# Patient Record
Sex: MALE | Race: WHITE | NOT HISPANIC OR LATINO | URBAN - METROPOLITAN AREA
[De-identification: names, ages, dates, MRNs, and addresses within clinical notes are randomized per-mention and may not be internally consistent; named-entity substitution may affect disease eponyms.]

---

## 2024-07-19 ENCOUNTER — OFFICE VISIT (OUTPATIENT)
Age: 54
End: 2024-07-19
Payer: COMMERCIAL

## 2024-07-19 VITALS
TEMPERATURE: 98.8 F | SYSTOLIC BLOOD PRESSURE: 152 MMHG | HEIGHT: 71 IN | DIASTOLIC BLOOD PRESSURE: 82 MMHG | BODY MASS INDEX: 21.73 KG/M2 | HEART RATE: 71 BPM | WEIGHT: 155.2 LBS | RESPIRATION RATE: 16 BRPM | OXYGEN SATURATION: 99 %

## 2024-07-19 DIAGNOSIS — R59.1 LYMPHADENOPATHY: ICD-10-CM

## 2024-07-19 DIAGNOSIS — L02.01 FACIAL ABSCESS: Primary | ICD-10-CM

## 2024-07-19 PROCEDURE — G0382 LEV 3 HOSP TYPE B ED VISIT: HCPCS | Performed by: EMERGENCY MEDICINE

## 2024-07-19 PROCEDURE — S9083 URGENT CARE CENTER GLOBAL: HCPCS | Performed by: EMERGENCY MEDICINE

## 2024-07-19 RX ORDER — CLINDAMYCIN HYDROCHLORIDE 300 MG/1
300 CAPSULE ORAL 3 TIMES DAILY
Qty: 21 CAPSULE | Refills: 0 | Status: SHIPPED | OUTPATIENT
Start: 2024-07-19 | End: 2024-07-26

## 2024-07-19 NOTE — PROGRESS NOTES
[facility]        NAME: Ulises Paiz is a 53 y.o. male  : 1970    MRN: [id number]  DATE: 2024  TIME: 10:22 AM    Assessment and Plan   Facial abscess [L02.01]  1. Facial abscess  clindamycin (CLEOCIN) 300 MG capsule    Ambulatory Referral to Otolaryngology      2. Lymphadenopathy  Ambulatory Referral to Otolaryngology            Patient Instructions     Patient Instructions   Patient Education     Lymphadenitis Discharge Instructions   About this topic   Lymphadenitis is another word for a swollen lymph node. A lymph node is a pea-shaped part of the lymph system that helps defend our body from germs. You have lymph nodes all over your body. They help trap foreign material and other cells that do not belong inside the body.  A swollen lymph node may mean there is an infection or disease in the body. Treatment for this condition depends on the cause. Most often, you will need drugs to treat your illness.  What care is needed at home?   Ask your doctor what you need to do when you go home. Make sure you ask questions if you do not understand what the doctor says.  Do not squeeze or try to drain the lymph node. This can make any infection you may have worse.  Place an ice pack or a bag of frozen peas wrapped in a towel over the painful part. Never put ice right on the skin. Do not leave the ice on more than 10 to 15 minutes at a time.  If your doctor tells you to use heat, put a heating pad on your painful area for no more than 20 minutes at a time. Never go to sleep with a heating pad on as this can cause burns.  What follow-up care is needed?   Your doctor may ask you to make visits to the office to check on your progress. Be sure to keep these visits.  What drugs may be needed?   The doctor may order drugs to:  Help with pain and swelling  Treat an infection  Treat the illness that causes the lymph nodes to swell and be painful  What problems could happen?   Infection  Pain  When do I need  to call the doctor?   Signs of infection. These include a fever of 100.4°F (38°C) or higher, chills.  More swelling, redness, or warmth over your lymph node.  Lymph node is draining fluid or pus.  Lymph nodes seem to be getting bigger.  Night sweats  Trouble swallowing or breathing  Teach Back: Helping You Understand   The Teach Back Method helps you understand the information we are giving you. After you talk with the staff, tell them in your own words what you learned. This helps to make sure the staff has described each thing clearly. It also helps to explain things that may have been confusing. Before going home, make sure you can do these:  I can tell you about my condition.  I can tell you what may help ease my pain.  I can tell you what I will do if I have a fever, my lymph nodes stay large or seem to be getting bigger, or I have trouble breathing or swallowing.  Last Reviewed Date   2019-04-18  Consumer Information Use and Disclaimer   This generalized information is a limited summary of diagnosis, treatment, and/or medication information. It is not meant to be comprehensive and should be used as a tool to help the user understand and/or assess potential diagnostic and treatment options. It does NOT include all information about conditions, treatments, medications, side effects, or risks that may apply to a specific patient. It is not intended to be medical advice or a substitute for the medical advice, diagnosis, or treatment of a health care provider based on the health care provider's examination and assessment of a patient’s specific and unique circumstances. Patients must speak with a health care provider for complete information about their health, medical questions, and treatment options, including any risks or benefits regarding use of medications. This information does not endorse any treatments or medications as safe, effective, or approved for treating a specific patient. [employer] and its  "affiliates disclaim any warranty or liability relating to this information or the use thereof. The use of this information is governed by the Terms of Use, available at https://www.[employer].com/en/know/clinical-effectiveness-terms   Copyright   Copyright © 2024 [employer] and its affiliates and/or licensors. All rights reserved.  Patient Education     Dental abscess   The Basics   Written by the doctors and editors at [facility]   What is a dental abscess? -- A dental abscess is a pocket of pus that forms in the mouth (figure 1). It is caused by an infection. It is also called a \"tooth abscess\" or \"abscessed tooth.\" Dental abscesses can form in the gums, next to a tooth, or in the end of the root of a tooth.  A dental abscess can be caused by bacteria that is found in plaque. Plaque is a sticky material that forms on the teeth and is made of germs and bits of food. Plaque is all throughout the mouth.  Regular brushing and flossing helps you get rid of plaque. If you don't do this, plaque can turn into a hard deposit called \"tartar.\" The bacteria can also spread into the soft tissue of your teeth and gums.  What causes a dental abscess? -- The most common causes are tooth decay and gum disease. Other possible causes include an injury to a tooth, or food or something else getting trapped under the gums.  What are the symptoms of a dental abscess? -- Pain is a common problem with a dental abscess. The pain is often throbbing and is near a tooth. The pain might spread into your jaw, neck, or ear. Lying down, chewing, or biting can make pain worse. If the abscess ruptures or breaks open, you might have relief from the pain along with a bad taste in your mouth.  A dental abscess can cause other symptoms besides pain. These might include:   Fever   Sensitive teeth (to hot or cold)   Bleeding, red, or swollen gums   Swelling in the lymph nodes in your neck, jaw, or face   Trouble opening your mouth all of the way   " Bad breath, or a bad taste in your mouth  Will I need tests? -- Most problems with the teeth and mouth are treated by a dentist. The dentist will check your teeth, gums, and mouth. They will gently touch and tap on the teeth to check them. The dentist might ask about problems with heat or cold or if the pain is worse when you lie down.  Sometimes, the dentist might do other tests, like an X-ray, to check your teeth.  How is a dental abscess treated? -- Treatment is based on what is causing the problem. Some of the treatments include:   Antibiotics to treat the infection   Making a small cut in the abscess to drain the pus, and then cleaning the area around it   Removing any irritant from under the gums or between the teeth, and deep cleaning the area with special dental tools and by rinsing it   Root canal - For this procedure, the dentist drills into the tooth to remove any pus and the infected pulp. Then, they seal the tooth to prevent another infection.   Pulling a tooth if it cannot be repaired  Is there anything I can do on my own to feel better? -- Ask the dentist what you should do when you go home. Make sure that you understand exactly what you need to do to care for yourself. Ask questions if there is anything you do not understand.  Your dentist might tell you to:   Take any antibiotics as prescribed. It is important to take all of your antibiotics even if you start to feel better.   Take medicines to relieve pain, such as acetaminophen (sample brand name: Tylenol), ibuprofen (sample brand names: Advil, Motrin), or naproxen (sample brand name: Aleve).   Avoid very cold or very hot food or drinks if they bother your tooth. Soft foods such as scrambled eggs or mashed potatoes might cause less pain with chewing.   Rinse your mouth with warm salt water. This can help drain the abscess. You can make salt water by mixing 1/4 to 1/2 teaspoon (1.5 to 3 grams) of salt into 8 ounces (240 mL) of warm water.  Can I do  anything to prevent another dental abscess?    Brush your teeth at least 2 times a day. Use toothpaste with fluoride.   Use dental floss to clean between your teeth every day.   See your dentist for regular cleaning and checkups. The dentist might put fluoride or a sealant on your teeth.   Eat a healthy diet. Try to avoid or limit foods and drinks that are high in acid, sugar, and starch. These include things like chocolate, sweets, cakes, and fizzy or sugary drinks.   Wear a mouth guard or headgear when playing sports. This can help to avoid tooth injury.   Stop smoking, if you smoke. Your doctor, dentist, or nurse can help you. Smoking can make some dental problems worse.  When should I call the doctor or dentist? -- Call for advice if:   You continue to have signs of infection, such as:   Fever of 100.4°F (38°C) or higher   Swelling of the gums, neck, or face   Discharge or pus around a tooth   The pain is getting worse or keeps you from sleeping.   You have trouble swallowing, breathing, chewing, or opening your mouth all of the way.   You have jaw pain with ear, chest, shoulder, or arm pain.  All topics are updated as new evidence becomes available and our peer review process is complete.  This topic retrieved from [employer] on: May 19, 2024.  Topic 180052 Version 1.0  Release: 32.4.3 - C32.138  © 2024 UpToDate, Inc. and/or its affiliates. All rights reserved.  figure 1: Dental abscess     Adental abscess is a collection of pus from an infection in the mouth. Dentalabscesses can form in the gums, next to a tooth, or in the root of a tooth.  Graphic 007605 Version 1.0  Consumer Information Use and Disclaimer   Disclaimer: This generalized information is a limited summary of diagnosis, treatment, and/or medication information. It is not meant to be comprehensive and should be used as a tool to help the user understand and/or assess potential diagnostic and treatment options. It does NOT include all information  about conditions, treatments, medications, side effects, or risks that may apply to a specific patient. It is not intended to be medical advice or a substitute for the medical advice, diagnosis, or treatment of a health care provider based on the health care provider's examination and assessment of a patient's specific and unique circumstances. Patients must speak with a health care provider for complete information about their health, medical questions, and treatment options, including any risks or benefits regarding use of medications. This information does not endorse any treatments or medications as safe, effective, or approved for treating a specific patient. [employer], Inc[employer] and its affiliates disclaim any warranty or liability relating to this information or the use thereof.The use of this information is governed by the Terms of Use, available at https://www.[employer].me/en/know/clinical-effectiveness-terms. 2024© Up[employer]Date, Inc. and its affiliates and/or licensors. All rights reserved.  Copyright   © 2024 [employer]Date, Inc. and/or its affiliates. All rights reserved.      Follow up with PCP in 3-5 days.  Proceed to  ER if symptoms worsen.    Chief Complaint     Chief Complaint   Patient presents with    Mass     Pt has been using smokeless tobacco for over a decade, presents today with a lump on bottom LEFT gum line making it difficult for him to open his mouth. 8/10 pain. Started Monday.          History of Present Illness       Patient complains of recurrent left intraoral swelling for the past 10 years. He has been using chewing tobacco since late teens. Swelling always resolved in past but is now associated with swelling and tenderness left lateral neck        Review of Systems   Review of Systems   Constitutional:  Negative for chills and fever.   HENT:  Positive for facial swelling. Negative for dental problem and voice change.    Respiratory:  Negative for shortness of breath.    Cardiovascular:   "Negative for chest pain.   Musculoskeletal:  Positive for neck pain.   Skin:  Negative for color change and wound.         Current Medications       Current Outpatient Medications:     clindamycin (CLEOCIN) 300 MG capsule, Take 1 capsule (300 mg total) by mouth 3 (three) times a day for 7 days, Disp: 21 capsule, Rfl: 0    Current Allergies     Allergies as of 07/19/2024    (No Known Allergies)            The following portions of the patient's history were reviewed and updated as appropriate: allergies, current medications, past family history, past medical history, past social history, past surgical history and problem list.     History reviewed. No pertinent past medical history.    History reviewed. No pertinent surgical history.    History reviewed. No pertinent family history.      Medications have been verified.        Objective   /82   Pulse 71   Temp 98.8 °F (37.1 °C)   Resp 16   Ht 5' 11\" (1.803 m)   Wt 70.4 kg (155 lb 3.3 oz)   SpO2 99%   BMI 21.65 kg/m²        Physical Exam     Physical Exam  Vitals and nursing note reviewed.   Constitutional:       General: He is not in acute distress.     Appearance: He is well-developed. He is not ill-appearing, toxic-appearing or diaphoretic.   HENT:      Head: Normocephalic and atraumatic.      Nose: Mucosal edema present. No rhinorrhea.      Mouth/Throat:      Pharynx: No posterior oropharyngeal erythema.   Cardiovascular:      Rate and Rhythm: Normal rate and regular rhythm.      Heart sounds: Normal heart sounds.   Musculoskeletal:         General: Normal range of motion.      Cervical back: Neck supple. Tenderness present.   Lymphadenopathy:      Cervical: Cervical adenopathy present.   Skin:     General: Skin is warm and dry.      Coloration: Skin is not pale.   Neurological:      Mental Status: He is alert and oriented to person, place, and time.   Psychiatric:         Mood and Affect: Mood normal.         Behavior: Behavior normal.         Thought " Content: Thought content normal.         Judgment: Judgment normal.

## 2024-07-19 NOTE — PATIENT INSTRUCTIONS
Patient Education     Lymphadenitis Discharge Instructions   About this topic   Lymphadenitis is another word for a swollen lymph node. A lymph node is a pea-shaped part of the lymph system that helps defend our body from germs. You have lymph nodes all over your body. They help trap foreign material and other cells that do not belong inside the body.  A swollen lymph node may mean there is an infection or disease in the body. Treatment for this condition depends on the cause. Most often, you will need drugs to treat your illness.  What care is needed at home?   Ask your doctor what you need to do when you go home. Make sure you ask questions if you do not understand what the doctor says.  Do not squeeze or try to drain the lymph node. This can make any infection you may have worse.  Place an ice pack or a bag of frozen peas wrapped in a towel over the painful part. Never put ice right on the skin. Do not leave the ice on more than 10 to 15 minutes at a time.  If your doctor tells you to use heat, put a heating pad on your painful area for no more than 20 minutes at a time. Never go to sleep with a heating pad on as this can cause burns.  What follow-up care is needed?   Your doctor may ask you to make visits to the office to check on your progress. Be sure to keep these visits.  What drugs may be needed?   The doctor may order drugs to:  Help with pain and swelling  Treat an infection  Treat the illness that causes the lymph nodes to swell and be painful  What problems could happen?   Infection  Pain  When do I need to call the doctor?   Signs of infection. These include a fever of 100.4°F (38°C) or higher, chills.  More swelling, redness, or warmth over your lymph node.  Lymph node is draining fluid or pus.  Lymph nodes seem to be getting bigger.  Night sweats  Trouble swallowing or breathing  Teach Back: Helping You Understand   The Teach Back Method helps you understand the information we are giving you. After  you talk with the staff, tell them in your own words what you learned. This helps to make sure the staff has described each thing clearly. It also helps to explain things that may have been confusing. Before going home, make sure you can do these:  I can tell you about my condition.  I can tell you what may help ease my pain.  I can tell you what I will do if I have a fever, my lymph nodes stay large or seem to be getting bigger, or I have trouble breathing or swallowing.  Last Reviewed Date   2019-04-18  Consumer Information Use and Disclaimer   This generalized information is a limited summary of diagnosis, treatment, and/or medication information. It is not meant to be comprehensive and should be used as a tool to help the user understand and/or assess potential diagnostic and treatment options. It does NOT include all information about conditions, treatments, medications, side effects, or risks that may apply to a specific patient. It is not intended to be medical advice or a substitute for the medical advice, diagnosis, or treatment of a health care provider based on the health care provider's examination and assessment of a patient’s specific and unique circumstances. Patients must speak with a health care provider for complete information about their health, medical questions, and treatment options, including any risks or benefits regarding use of medications. This information does not endorse any treatments or medications as safe, effective, or approved for treating a specific patient. [employer] and its affiliates disclaim any warranty or liability relating to this information or the use thereof. The use of this information is governed by the Terms of Use, available at https://www.wolterskluwer.com/en/know/clinical-effectiveness-terms   Copyright   Copyright © 2024 [employer] and its affiliates and/or licensors. All rights reserved.  Patient Education     Dental abscess   The Basics   Written by the  "doctors and editors at [facility]   What is a dental abscess? -- A dental abscess is a pocket of pus that forms in the mouth (figure 1). It is caused by an infection. It is also called a \"tooth abscess\" or \"abscessed tooth.\" Dental abscesses can form in the gums, next to a tooth, or in the end of the root of a tooth.  A dental abscess can be caused by bacteria that is found in plaque. Plaque is a sticky material that forms on the teeth and is made of germs and bits of food. Plaque is all throughout the mouth.  Regular brushing and flossing helps you get rid of plaque. If you don't do this, plaque can turn into a hard deposit called \"tartar.\" The bacteria can also spread into the soft tissue of your teeth and gums.  What causes a dental abscess? -- The most common causes are tooth decay and gum disease. Other possible causes include an injury to a tooth, or food or something else getting trapped under the gums.  What are the symptoms of a dental abscess? -- Pain is a common problem with a dental abscess. The pain is often throbbing and is near a tooth. The pain might spread into your jaw, neck, or ear. Lying down, chewing, or biting can make pain worse. If the abscess ruptures or breaks open, you might have relief from the pain along with a bad taste in your mouth.  A dental abscess can cause other symptoms besides pain. These might include:   Fever   Sensitive teeth (to hot or cold)   Bleeding, red, or swollen gums   Swelling in the lymph nodes in your neck, jaw, or face   Trouble opening your mouth all of the way   Bad breath, or a bad taste in your mouth  Will I need tests? -- Most problems with the teeth and mouth are treated by a dentist. The dentist will check your teeth, gums, and mouth. They will gently touch and tap on the teeth to check them. The dentist might ask about problems with heat or cold or if the pain is worse when you lie down.  Sometimes, the dentist might do other tests, like an X-ray, to check " your teeth.  How is a dental abscess treated? -- Treatment is based on what is causing the problem. Some of the treatments include:   Antibiotics to treat the infection   Making a small cut in the abscess to drain the pus, and then cleaning the area around it   Removing any irritant from under the gums or between the teeth, and deep cleaning the area with special dental tools and by rinsing it   Root canal - For this procedure, the dentist drills into the tooth to remove any pus and the infected pulp. Then, they seal the tooth to prevent another infection.   Pulling a tooth if it cannot be repaired  Is there anything I can do on my own to feel better? -- Ask the dentist what you should do when you go home. Make sure that you understand exactly what you need to do to care for yourself. Ask questions if there is anything you do not understand.  Your dentist might tell you to:   Take any antibiotics as prescribed. It is important to take all of your antibiotics even if you start to feel better.   Take medicines to relieve pain, such as acetaminophen (sample brand name: Tylenol), ibuprofen (sample brand names: Advil, Motrin), or naproxen (sample brand name: Aleve).   Avoid very cold or very hot food or drinks if they bother your tooth. Soft foods such as scrambled eggs or mashed potatoes might cause less pain with chewing.   Rinse your mouth with warm salt water. This can help drain the abscess. You can make salt water by mixing 1/4 to 1/2 teaspoon (1.5 to 3 grams) of salt into 8 ounces (240 mL) of warm water.  Can I do anything to prevent another dental abscess?    Brush your teeth at least 2 times a day. Use toothpaste with fluoride.   Use dental floss to clean between your teeth every day.   See your dentist for regular cleaning and checkups. The dentist might put fluoride or a sealant on your teeth.   Eat a healthy diet. Try to avoid or limit foods and drinks that are high in acid, sugar, and starch. These include  things like chocolate, sweets, cakes, and fizzy or sugary drinks.   Wear a mouth guard or headgear when playing sports. This can help to avoid tooth injury.   Stop smoking, if you smoke. Your doctor, dentist, or nurse can help you. Smoking can make some dental problems worse.  When should I call the doctor or dentist? -- Call for advice if:   You continue to have signs of infection, such as:   Fever of 100.4°F (38°C) or higher   Swelling of the gums, neck, or face   Discharge or pus around a tooth   The pain is getting worse or keeps you from sleeping.   You have trouble swallowing, breathing, chewing, or opening your mouth all of the way.   You have jaw pain with ear, chest, shoulder, or arm pain.  All topics are updated as new evidence becomes available and our peer review process is complete.  This topic retrieved from [employer] on: May 19, 2024.  Topic 623465 Version 1.0  Release: 32.4.3 - C32.138  © 2024 [employer]ToDate, Inc. and/or its affiliates. All rights reserved.  figure 1: Dental abscess     Adental abscess is a collection of pus from an infection in the mouth. Dentalabscesses can form in the gums, next to a tooth, or in the root of a tooth.  Graphic 735200 Version 1.0  Consumer Information Use and Disclaimer   Disclaimer: This generalized information is a limited summary of diagnosis, treatment, and/or medication information. It is not meant to be comprehensive and should be used as a tool to help the user understand and/or assess potential diagnostic and treatment options. It does NOT include all information about conditions, treatments, medications, side effects, or risks that may apply to a specific patient. It is not intended to be medical advice or a substitute for the medical advice, diagnosis, or treatment of a health care provider based on the health care provider's examination and assessment of a patient's specific and unique circumstances. Patients must speak with a health care provider for complete  information about their health, medical questions, and treatment options, including any risks or benefits regarding use of medications. This information does not endorse any treatments or medications as safe, effective, or approved for treating a specific patient. [employer] and its affiliates disclaim any warranty or liability relating to this information or the use thereof.The use of this information is governed by the Terms of Use, available at https://www.woltersThe Logo Companyuwer.com/en/know/clinical-effectiveness-terms. 2024© [employer], Inc. and its affiliates and/or licensors. All rights reserved.  Copyright   © 2024 [employer], [employer]. and/or its affiliates. All rights reserved.